# Patient Record
Sex: FEMALE | Race: WHITE | NOT HISPANIC OR LATINO
[De-identification: names, ages, dates, MRNs, and addresses within clinical notes are randomized per-mention and may not be internally consistent; named-entity substitution may affect disease eponyms.]

---

## 2017-07-29 ENCOUNTER — HEALTH MAINTENANCE LETTER (OUTPATIENT)
Age: 37
End: 2017-07-29

## 2018-01-09 ENCOUNTER — OFFICE VISIT - HEALTHEAST (OUTPATIENT)
Dept: FAMILY MEDICINE | Facility: CLINIC | Age: 38
End: 2018-01-09

## 2018-01-09 ENCOUNTER — COMMUNICATION - HEALTHEAST (OUTPATIENT)
Dept: TELEHEALTH | Facility: CLINIC | Age: 38
End: 2018-01-09

## 2018-01-09 DIAGNOSIS — L40.9 PSORIASIS: ICD-10-CM

## 2018-01-09 DIAGNOSIS — L21.9 SEBORRHEA: ICD-10-CM

## 2018-01-09 ASSESSMENT — MIFFLIN-ST. JEOR: SCORE: 1298.18

## 2018-02-08 ENCOUNTER — OFFICE VISIT - HEALTHEAST (OUTPATIENT)
Dept: FAMILY MEDICINE | Facility: CLINIC | Age: 38
End: 2018-02-08

## 2018-02-08 ENCOUNTER — COMMUNICATION - HEALTHEAST (OUTPATIENT)
Dept: FAMILY MEDICINE | Facility: CLINIC | Age: 38
End: 2018-02-08

## 2018-02-08 DIAGNOSIS — R53.83 FATIGUE: ICD-10-CM

## 2018-02-08 LAB
ANION GAP SERPL CALCULATED.3IONS-SCNC: 9 MMOL/L (ref 5–18)
BUN SERPL-MCNC: 15 MG/DL (ref 8–22)
CALCIUM SERPL-MCNC: 9.2 MG/DL (ref 8.5–10.5)
CHLORIDE BLD-SCNC: 104 MMOL/L (ref 98–107)
CHOLEST SERPL-MCNC: 210 MG/DL
CO2 SERPL-SCNC: 26 MMOL/L (ref 22–31)
CREAT SERPL-MCNC: 0.84 MG/DL (ref 0.6–1.1)
ERYTHROCYTE [DISTWIDTH] IN BLOOD BY AUTOMATED COUNT: 11.2 % (ref 11–14.5)
FASTING STATUS PATIENT QL REPORTED: YES
GFR SERPL CREATININE-BSD FRML MDRD: >60 ML/MIN/1.73M2
GLUCOSE BLD-MCNC: 82 MG/DL (ref 70–125)
HCT VFR BLD AUTO: 40.6 % (ref 35–47)
HDLC SERPL-MCNC: 94 MG/DL
HGB BLD-MCNC: 13.8 G/DL (ref 12–16)
LDLC SERPL CALC-MCNC: 102 MG/DL
MCH RBC QN AUTO: 31.5 PG (ref 27–34)
MCHC RBC AUTO-ENTMCNC: 34 G/DL (ref 32–36)
MCV RBC AUTO: 93 FL (ref 80–100)
PLATELET # BLD AUTO: 196 THOU/UL (ref 140–440)
PMV BLD AUTO: 7.6 FL (ref 7–10)
POTASSIUM BLD-SCNC: 4.8 MMOL/L (ref 3.5–5)
RBC # BLD AUTO: 4.39 MILL/UL (ref 3.8–5.4)
SODIUM SERPL-SCNC: 139 MMOL/L (ref 136–145)
TRIGL SERPL-MCNC: 69 MG/DL
TSH SERPL DL<=0.005 MIU/L-ACNC: 1.06 UIU/ML (ref 0.3–5)
WBC: 4 THOU/UL (ref 4–11)

## 2018-02-08 ASSESSMENT — MIFFLIN-ST. JEOR: SCORE: 1304.41

## 2019-05-21 ENCOUNTER — APPOINTMENT (OUTPATIENT)
Age: 39
Setting detail: DERMATOLOGY
End: 2019-05-21

## 2019-05-21 VITALS — HEIGHT: 67 IN | WEIGHT: 140 LBS

## 2019-05-21 DIAGNOSIS — L40.0 PSORIASIS VULGARIS: ICD-10-CM

## 2019-05-21 PROCEDURE — OTHER PRESCRIPTION: OTHER

## 2019-05-21 PROCEDURE — OTHER COUNSELING: OTHER

## 2019-05-21 PROCEDURE — 99213 OFFICE O/P EST LOW 20 MIN: CPT

## 2019-05-21 RX ORDER — USTEKINUMAB 45 MG/.5ML
INJECTION, SOLUTION SUBCUTANEOUS
Qty: 1 | Refills: 3 | Status: ERX | COMMUNITY
Start: 2019-05-21

## 2019-05-21 ASSESSMENT — LOCATION DETAILED DESCRIPTION DERM: LOCATION DETAILED: LEFT PROXIMAL DORSAL FOREARM

## 2019-05-21 ASSESSMENT — LOCATION SIMPLE DESCRIPTION DERM: LOCATION SIMPLE: LEFT FOREARM

## 2019-05-21 ASSESSMENT — LOCATION ZONE DERM: LOCATION ZONE: ARM

## 2019-05-21 NOTE — HPI: RASH (PSORIASIS)
Do You Have A Family History Of Psoriasis?: no
How Severe Is Your Psoriasis?: moderate
Is This A New Presentation, Or A Follow-Up?: Psoriasis
Additional History: Patient received 2 injections of Taltz at her last office visit on 4/19/2019. Patient had a severe allergic reaction. Confirmed by Maame Dash. She has been going to tanning bed and using Betamethasone with some benefit.

## 2020-05-20 ENCOUNTER — APPOINTMENT (OUTPATIENT)
Age: 40
Setting detail: DERMATOLOGY
End: 2020-05-20

## 2020-05-20 VITALS — HEIGHT: 67 IN | RESPIRATION RATE: 16 BRPM | WEIGHT: 145 LBS

## 2020-05-20 DIAGNOSIS — L40.0 PSORIASIS VULGARIS: ICD-10-CM

## 2020-05-20 PROCEDURE — OTHER PRESCRIPTION SAMPLES PROVIDED: OTHER

## 2020-05-20 PROCEDURE — OTHER COUNSELING: OTHER

## 2020-05-20 PROCEDURE — OTHER REASON FOR TELEMEDICINE VISIT: OTHER

## 2020-05-20 PROCEDURE — OTHER ADDITIONAL NOTES: OTHER

## 2020-05-20 PROCEDURE — 99213 OFFICE O/P EST LOW 20 MIN: CPT | Mod: 95

## 2020-05-20 ASSESSMENT — LOCATION DETAILED DESCRIPTION DERM
LOCATION DETAILED: LEFT CENTRAL FRONTAL SCALP
LOCATION DETAILED: RIGHT ACHILLES SKIN
LOCATION DETAILED: RIGHT MID-UPPER BACK
LOCATION DETAILED: LEFT ELBOW

## 2020-05-20 ASSESSMENT — LOCATION ZONE DERM
LOCATION ZONE: SCALP
LOCATION ZONE: ARM
LOCATION ZONE: LEG
LOCATION ZONE: TRUNK

## 2020-05-20 ASSESSMENT — LOCATION SIMPLE DESCRIPTION DERM
LOCATION SIMPLE: LEFT SCALP
LOCATION SIMPLE: RIGHT UPPER BACK
LOCATION SIMPLE: RIGHT ACHILLES SKIN
LOCATION SIMPLE: LEFT ELBOW

## 2020-05-20 ASSESSMENT — BSA PSORIASIS: % BODY COVERED IN PSORIASIS: 8

## 2020-05-20 NOTE — PROCEDURE: PRESCRIPTION SAMPLES PROVIDED
Detail Level: Zone
Samples Given: Pt will go to [facility] and see if samples of cosentyx are available for her. No samples given today due to nature of visit

## 2020-05-20 NOTE — HPI: RASH (PSORIASIS)
How Severe Is Your Psoriasis?: moderate
Is This A New Presentation, Or A Follow-Up?: Follow Up Psoriasis
Additional History: The patient states she only received 2 doses of Stelara ever since the pandemic hit, as [facility] was having trouble sending her medication through due to wrong contact information. Otherwise, patient has not seen improvements yet.\\nHer last dose was in April and the dosage before that was in November. She never did the ramping dosage.\\nWhen she had her talked injection immediately she felt burning in her legs and her lymph nodes became swollen for at least a month afterwards.

## 2020-05-20 NOTE — PROCEDURE: ADDITIONAL NOTES
Additional Notes: Since she started stelara she has noticed no improvement. Today she has psoriasis on her scalp which is very itchy, her ankles, and her arms. She does admit to having knee stiffness worse in the morning and better as she starts moving. She is also very active and exercises every day. Started collagen  x 3 wks and thinks it’s helped.\\n\\nShe thinks her dad might have had psoriasis as he had a very flaky scalp but she is not sure.\\n\\nHaving been on stelara for six months now, with no improvement, she elects to start a new medication.\\nShe recalls that her plaques cleared within the first few weeks of starting Taltz. However due to her allergic reaction she is concerned that if she tried other medications she would also have an allergic reaction. We assured her that taltz is known for their injection site reactions and sensitivities. I have had patients with other biological after having failed taltz due to site reactions. She verbalized reassurance.\\nWill stop stelara injections and initiate cosentyx. I will call her in regards to the on boarding process of cosentyx and she will return to clinic  for a nurse visit for bloodwork including tb and to fill out cosentyx paperwork. \\n\\nWill send in betamethasone soln for her scalp. \\nFollow up in 1 mo after starting cosentyx.
Detail Level: Simple

## 2020-05-22 ENCOUNTER — APPOINTMENT (OUTPATIENT)
Age: 40
Setting detail: DERMATOLOGY
End: 2020-05-22

## 2020-05-22 ENCOUNTER — RX ONLY (RX ONLY)
Age: 40
End: 2020-05-22

## 2020-05-22 DIAGNOSIS — L40.0 PSORIASIS VULGARIS: ICD-10-CM

## 2020-05-22 PROCEDURE — OTHER VENIPUNCTURE: OTHER

## 2020-05-22 PROCEDURE — OTHER ORDER TESTS: OTHER

## 2020-05-22 PROCEDURE — OTHER PRESCRIPTION SAMPLES PROVIDED: OTHER

## 2020-05-22 PROCEDURE — 36415 COLL VENOUS BLD VENIPUNCTURE: CPT

## 2020-05-22 PROCEDURE — OTHER ADDITIONAL NOTES: OTHER

## 2020-05-22 NOTE — PROCEDURE: ADDITIONAL NOTES
Detail Level: Simple
Additional Notes: Patient had a telemedicine visit with [facility] 5/20/20 and filled out Cosentyx paperwork today.

## 2020-05-22 NOTE — PROCEDURE: VENIPUNCTURE
Number Of Tubes Drawn: 3
Detail Level: None
Venipuncture Paragraph: An alcohol pad was applied to the venipuncture site. Venipuncture was performed using a butterfly needle. Pressure and a bandaid was applied to the site. No complications were noted.
Bill For Individual Tests Below?: no

## 2020-05-22 NOTE — PROCEDURE: PRESCRIPTION SAMPLES PROVIDED
Lot/Batch Number (Optional): [id number]
Samples Given: 5 boxes of Cosentyx for complete loading dose
Expiration Date (Optional): 08/2021
Detail Level: Zone

## 2020-05-28 ENCOUNTER — RX ONLY (RX ONLY)
Age: 40
End: 2020-05-28

## 2020-10-15 ENCOUNTER — RX ONLY (RX ONLY)
Age: 40
End: 2020-10-15

## 2020-10-15 RX ORDER — SECUKINUMAB 150 MG/ML
INJECTION SUBCUTANEOUS
Qty: 1 | Refills: 1 | Status: ERX

## 2020-12-17 ENCOUNTER — APPOINTMENT (OUTPATIENT)
Dept: URBAN - METROPOLITAN AREA CLINIC 252 | Age: 40
Setting detail: DERMATOLOGY
End: 2020-12-17

## 2020-12-17 VITALS — HEIGHT: 67 IN | WEIGHT: 136 LBS | RESPIRATION RATE: 16 BRPM

## 2020-12-17 DIAGNOSIS — L40.0 PSORIASIS VULGARIS: ICD-10-CM

## 2020-12-17 PROCEDURE — 36415 COLL VENOUS BLD VENIPUNCTURE: CPT

## 2020-12-17 PROCEDURE — OTHER ORDER TESTS: OTHER

## 2020-12-17 PROCEDURE — 99213 OFFICE O/P EST LOW 20 MIN: CPT | Mod: 25

## 2020-12-17 PROCEDURE — OTHER COSENTYX MONITORING: OTHER

## 2020-12-17 PROCEDURE — OTHER PRESCRIPTION: OTHER

## 2020-12-17 PROCEDURE — OTHER VENIPUNCTURE: OTHER

## 2020-12-17 RX ORDER — SECUKINUMAB 150 MG/ML
300MG INJECTION SUBCUTANEOUS
Qty: 3 | Refills: 0 | Status: ERX | COMMUNITY
Start: 2020-12-17

## 2020-12-17 ASSESSMENT — LOCATION DETAILED DESCRIPTION DERM
LOCATION DETAILED: LEFT PROXIMAL DORSAL FOREARM
LOCATION DETAILED: HAIR
LOCATION DETAILED: LEFT ANTECUBITAL SKIN
LOCATION DETAILED: LEFT KNEE
LOCATION DETAILED: RIGHT ELBOW
LOCATION DETAILED: RIGHT KNEE

## 2020-12-17 ASSESSMENT — LOCATION SIMPLE DESCRIPTION DERM
LOCATION SIMPLE: LEFT FOREARM
LOCATION SIMPLE: RIGHT ELBOW
LOCATION SIMPLE: HAIR
LOCATION SIMPLE: RIGHT KNEE
LOCATION SIMPLE: LEFT UPPER ARM
LOCATION SIMPLE: LEFT KNEE

## 2020-12-17 ASSESSMENT — LOCATION ZONE DERM
LOCATION ZONE: LEG
LOCATION ZONE: SCALP
LOCATION ZONE: ARM

## 2020-12-17 NOTE — HPI: MEDICATION (COSENTYX)
Is This A New Presentation, Or A Follow-Up?: Follow Up Cosraymondx
Additional History: Started Cosentyx May 2020.  Last injection was 1 month ago. Skin is doing great. Normally flares in winter but not this years. Arthritis is significantly improved.

## 2021-01-21 ENCOUNTER — RX ONLY (RX ONLY)
Age: 41
End: 2021-01-21

## 2021-01-21 RX ORDER — SECUKINUMAB 150 MG/ML
INJECTION SUBCUTANEOUS
Qty: 1 | Refills: 2 | Status: ERX

## 2021-04-26 ENCOUNTER — RX ONLY (RX ONLY)
Age: 41
End: 2021-04-26

## 2021-04-26 RX ORDER — SECUKINUMAB 150 MG/ML
INJECTION SUBCUTANEOUS
Qty: 1 | Refills: 2 | Status: ERX

## 2021-05-31 VITALS — WEIGHT: 137 LBS | HEIGHT: 66 IN | BODY MASS INDEX: 22.02 KG/M2

## 2021-06-01 VITALS — WEIGHT: 138.38 LBS | BODY MASS INDEX: 22.24 KG/M2 | HEIGHT: 66 IN

## 2021-06-15 NOTE — PROGRESS NOTES
"Chief Complaint   Patient presents with     Annual Exam     Px, weight has increased, family hx thyroid, family hx high BP, fasting       HPI: This very pleasant and highly motivated 37-year-old lady presents today with concerns about weight gain.  Currently weight is 138 and she notes that several months ago she was 127.  She has gained 10 pounds yet has continued her normal, routine, highly structured exercise program.  She is conscious of her dietary intake and consumes a healthy diet and abstain from alcohol.  Family history is notable for thyroid disease.  She is sleeping well, yet feels tired during the day.  No psychosocial stressors or changes although her  has a past history of heart disease which is currently being treated.    ROS: No syncope or chest pain.  No neck swelling or thyroid enlargement    SH: She does not smoke or drink     FH: Notable for thyroid disease    Meds:  Mary Alice has a current medication list which includes the following prescription(s): clobetasol, coal tar, fluocinonide, hydrocortisone, and fluoxetine.    O:  /88  Pulse 77  Resp 16  Ht 5' 6\" (1.676 m)  Wt 138 lb 6 oz (62.8 kg)  SpO2 99%  BMI 22.33 kg/m2    Neck supple with no thyromegaly.  Thyroids freely movable with no evidence of nodules or goiter    A/P:   1. Fatigue  - Thyroid Stimulating Hormone (TSH)  - Basic Metabolic Panel  - HM2(CBC w/o Differential)  - Lipid Cascade  -25 minutes spent total time in care                                        "

## 2021-06-15 NOTE — PROGRESS NOTES
"Chief Complaint   Patient presents with     dry scalp     Rash     hands, arms, legs, ankles, history of ring worm     Anxiety       HPI: Very pleasant 37-year-old female presents with dermatological concerns.  She has a rash on the olecranon surface of both elbows it has been red, plaque-like, and nonpruritic.  She has tried multiple medications with no help.  She also had \"ringworm\" diagnosed in her left lower extremity which was treated with an antifungal by another physician and that his improved.  She denies ever having this rash before.    She also has noticed flaking in her scalp for the past several months.  She has tried multiple over-the-counter shampoos without any help.    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    ROS: No fever, no cough, no other rashes.    SH: She does not smoke  FH: Hypertension, stroke diabetes and cancer    Meds:  Mary Alice has a current medication list which includes the following prescription(s): clobetasol, coal tar, fluocinonide, fluoxetine, and hydrocortisone.    O:  /90  Pulse 84  Resp 16  Ht 5' 6\" (1.676 m)  Wt 137 lb (62.1 kg)  SpO2 99%  BMI 22.11 kg/m2  Examination of the skin of both elbows show red plaque-like lesions on the olecranon surface of both elbows.  1-2 cm round plaque-like lesions on the lower extremities.  Examination of the scalp shows seborrhea globally    A/P:   1. Psoriasis  -Temovate cream to be applied twice daily  - Ambulatory referral to Dermatology    2. Seborrhea  -Coal tar shampoo    25 minutes spent total time                                          "

## 2021-10-08 ENCOUNTER — APPOINTMENT (OUTPATIENT)
Dept: URBAN - METROPOLITAN AREA CLINIC 252 | Age: 41
Setting detail: DERMATOLOGY
End: 2021-10-08

## 2021-10-08 VITALS — RESPIRATION RATE: 14 BRPM | WEIGHT: 133 LBS | HEIGHT: 67 IN

## 2021-10-08 DIAGNOSIS — L40.0 PSORIASIS VULGARIS: ICD-10-CM

## 2021-10-08 PROCEDURE — OTHER PRESCRIPTION: OTHER

## 2021-10-08 PROCEDURE — OTHER ORDER TESTS: OTHER

## 2021-10-08 PROCEDURE — 36415 COLL VENOUS BLD VENIPUNCTURE: CPT

## 2021-10-08 PROCEDURE — OTHER VENIPUNCTURE: OTHER

## 2021-10-08 PROCEDURE — OTHER COSENTYX MONITORING: OTHER

## 2021-10-08 PROCEDURE — 99214 OFFICE O/P EST MOD 30 MIN: CPT | Mod: 25

## 2021-10-08 RX ORDER — SECUKINUMAB 150 MG/ML
300MG INJECTION SUBCUTANEOUS
Qty: 3 | Refills: 1 | Status: ERX

## 2021-10-08 ASSESSMENT — LOCATION DETAILED DESCRIPTION DERM
LOCATION DETAILED: RIGHT ELBOW
LOCATION DETAILED: HAIR
LOCATION DETAILED: LEFT ANTECUBITAL SKIN
LOCATION DETAILED: LEFT KNEE
LOCATION DETAILED: LEFT ELBOW
LOCATION DETAILED: RIGHT KNEE

## 2021-10-08 ASSESSMENT — LOCATION SIMPLE DESCRIPTION DERM
LOCATION SIMPLE: HAIR
LOCATION SIMPLE: LEFT UPPER ARM
LOCATION SIMPLE: LEFT KNEE
LOCATION SIMPLE: RIGHT ELBOW
LOCATION SIMPLE: LEFT ELBOW
LOCATION SIMPLE: RIGHT KNEE

## 2021-10-08 ASSESSMENT — LOCATION ZONE DERM
LOCATION ZONE: LEG
LOCATION ZONE: ARM
LOCATION ZONE: SCALP

## 2021-10-08 NOTE — HPI: MEDICATION (COSENTYX)
How Severe Is It?: moderate
Is This A New Presentation, Or A Follow-Up?: Follow Up Cosraymondx
Additional History: Last used Cosentyx 3 months ago or more. Started Cosentyx may 2020. Arthritis was improved with Cosentyx. ScLp started flaring 1.5 week ago. Joint still stable. Was inspconsistantly injecting Cosentyx before 3 months ago.  Denies any side effects.

## 2022-03-14 ENCOUNTER — RX ONLY (RX ONLY)
Age: 42
End: 2022-03-14

## 2022-03-14 RX ORDER — SECUKINUMAB 150 MG/ML
300MG INJECTION SUBCUTANEOUS
Qty: 1 | Refills: 0 | Status: ERX | COMMUNITY
Start: 2022-03-14

## 2022-03-24 ENCOUNTER — RX ONLY (RX ONLY)
Age: 42
End: 2022-03-24

## 2022-03-24 RX ORDER — SECUKINUMAB 150 MG/ML
INJECTION SUBCUTANEOUS
Qty: 1 | Refills: 0 | Status: ERX

## 2022-05-05 ENCOUNTER — RX ONLY (RX ONLY)
Age: 42
End: 2022-05-05

## 2022-05-05 RX ORDER — SECUKINUMAB 150 MG/ML
INJECTION SUBCUTANEOUS
Qty: 1 | Refills: 0 | Status: ERX

## 2022-05-20 ENCOUNTER — RX ONLY (RX ONLY)
Age: 42
End: 2022-05-20

## 2022-05-20 ENCOUNTER — APPOINTMENT (OUTPATIENT)
Dept: URBAN - METROPOLITAN AREA CLINIC 252 | Age: 42
Setting detail: DERMATOLOGY
End: 2022-05-20

## 2022-05-20 VITALS — WEIGHT: 145 LBS | HEIGHT: 67 IN | RESPIRATION RATE: 16 BRPM

## 2022-05-20 DIAGNOSIS — L40.0 PSORIASIS VULGARIS: ICD-10-CM

## 2022-05-20 PROCEDURE — OTHER ORDER TESTS: OTHER

## 2022-05-20 PROCEDURE — OTHER PRESCRIPTION SAMPLES PROVIDED: OTHER

## 2022-05-20 PROCEDURE — 36415 COLL VENOUS BLD VENIPUNCTURE: CPT

## 2022-05-20 PROCEDURE — OTHER MIPS QUALITY: OTHER

## 2022-05-20 PROCEDURE — 99214 OFFICE O/P EST MOD 30 MIN: CPT

## 2022-05-20 PROCEDURE — OTHER PRESCRIPTION: OTHER

## 2022-05-20 PROCEDURE — OTHER COSENTYX MONITORING: OTHER

## 2022-05-20 PROCEDURE — OTHER VENIPUNCTURE: OTHER

## 2022-05-20 RX ORDER — SECUKINUMAB 150 MG/ML
300MG INJECTION SUBCUTANEOUS
Qty: 1 | Refills: 6 | Status: ERX

## 2022-05-20 ASSESSMENT — PGA PSORIASIS: PGA PSORIASIS 2020: MILD

## 2022-05-20 ASSESSMENT — LOCATION SIMPLE DESCRIPTION DERM
LOCATION SIMPLE: LEFT FOREARM
LOCATION SIMPLE: RIGHT KNEE
LOCATION SIMPLE: LEFT UPPER ARM
LOCATION SIMPLE: LEFT KNEE
LOCATION SIMPLE: RIGHT ELBOW
LOCATION SIMPLE: POSTERIOR SCALP

## 2022-05-20 ASSESSMENT — LOCATION DETAILED DESCRIPTION DERM
LOCATION DETAILED: MID-OCCIPITAL SCALP
LOCATION DETAILED: RIGHT ELBOW
LOCATION DETAILED: LEFT KNEE
LOCATION DETAILED: LEFT PROXIMAL DORSAL FOREARM
LOCATION DETAILED: LEFT ANTECUBITAL SKIN
LOCATION DETAILED: RIGHT KNEE

## 2022-05-20 ASSESSMENT — LOCATION ZONE DERM
LOCATION ZONE: LEG
LOCATION ZONE: SCALP
LOCATION ZONE: ARM

## 2022-05-20 NOTE — HPI: MEDICATION (COSENTYX)
How Severe Is It?: moderate
Is This A New Presentation, Or A Follow-Up?: Follow Up Cosraymondx
Additional History: Most recent injection was about 1.5 weeks ago. Was clear in the past. Was off Cosentyx for about 6 months.

## 2022-05-23 RX ORDER — SECUKINUMAB 150 MG/ML
INJECTION SUBCUTANEOUS
Qty: 1 | Refills: 6 | Status: ERX

## 2022-11-22 ENCOUNTER — APPOINTMENT (OUTPATIENT)
Dept: URBAN - METROPOLITAN AREA CLINIC 252 | Age: 42
Setting detail: DERMATOLOGY
End: 2022-11-22

## 2022-11-22 VITALS — HEIGHT: 67 IN | WEIGHT: 136 LBS

## 2022-11-22 DIAGNOSIS — L40.0 PSORIASIS VULGARIS: ICD-10-CM

## 2022-11-22 PROCEDURE — 36415 COLL VENOUS BLD VENIPUNCTURE: CPT

## 2022-11-22 PROCEDURE — OTHER ORDER TESTS: OTHER

## 2022-11-22 PROCEDURE — OTHER PRESCRIPTION: OTHER

## 2022-11-22 PROCEDURE — 99214 OFFICE O/P EST MOD 30 MIN: CPT

## 2022-11-22 PROCEDURE — OTHER MIPS QUALITY: OTHER

## 2022-11-22 PROCEDURE — OTHER VENIPUNCTURE: OTHER

## 2022-11-22 PROCEDURE — OTHER COSENTYX MONITORING: OTHER

## 2022-11-22 RX ORDER — SECUKINUMAB 150 MG/ML
300MG INJECTION SUBCUTANEOUS
Qty: 1 | Refills: 12 | Status: ERX

## 2022-11-22 ASSESSMENT — LOCATION DETAILED DESCRIPTION DERM
LOCATION DETAILED: LEFT ELBOW
LOCATION DETAILED: HAIR
LOCATION DETAILED: RIGHT PROXIMAL DORSAL FOREARM
LOCATION DETAILED: LEFT KNEE
LOCATION DETAILED: LEFT ANTECUBITAL SKIN
LOCATION DETAILED: RIGHT KNEE

## 2022-11-22 ASSESSMENT — LOCATION SIMPLE DESCRIPTION DERM
LOCATION SIMPLE: RIGHT FOREARM
LOCATION SIMPLE: RIGHT KNEE
LOCATION SIMPLE: LEFT ELBOW
LOCATION SIMPLE: LEFT KNEE
LOCATION SIMPLE: LEFT UPPER ARM
LOCATION SIMPLE: HAIR

## 2022-11-22 ASSESSMENT — LOCATION ZONE DERM
LOCATION ZONE: ARM
LOCATION ZONE: SCALP
LOCATION ZONE: LEG

## 2022-11-22 ASSESSMENT — PGA PSORIASIS: PGA PSORIASIS 2020: CLEAR

## 2022-11-22 NOTE — HPI: MEDICATION (COSENTYX)
How Severe Is It?: moderate
Is This A New Presentation, Or A Follow-Up?: Follow Up Cosraymondx
Additional History: Denies side effects. Started Cosentyx originally may 2020. She reports completly. She reports joints are wothout stiffness or pain.

## 2022-11-22 NOTE — PROCEDURE: COSENTYX MONITORING
Add High Risk Medication Management Associated Diagnosis?: Yes
Patient Reported Weight(Optional But Include Units): 136
Detail Level: Zone

## 2023-07-26 RX ORDER — SECUKINUMAB 150 MG/ML
300MG INJECTION SUBCUTANEOUS
Qty: 1 | Refills: 3 | Status: ERX

## 2023-08-09 RX ORDER — SECUKINUMAB 150 MG/ML
300MG INJECTION SUBCUTANEOUS
Qty: 1 | Refills: 3 | Status: ERX